# Patient Record
Sex: MALE | Race: BLACK OR AFRICAN AMERICAN | Employment: FULL TIME | URBAN - METROPOLITAN AREA
[De-identification: names, ages, dates, MRNs, and addresses within clinical notes are randomized per-mention and may not be internally consistent; named-entity substitution may affect disease eponyms.]

---

## 2021-01-07 ENCOUNTER — OFFICE VISIT (OUTPATIENT)
Dept: INTERNAL MEDICINE | Facility: CLINIC | Age: 31
End: 2021-01-07
Payer: COMMERCIAL

## 2021-01-07 VITALS
BODY MASS INDEX: 36.26 KG/M2 | DIASTOLIC BLOOD PRESSURE: 76 MMHG | TEMPERATURE: 98 F | SYSTOLIC BLOOD PRESSURE: 118 MMHG | HEIGHT: 67 IN | WEIGHT: 231 LBS | HEART RATE: 68 BPM | OXYGEN SATURATION: 98 %

## 2021-01-07 DIAGNOSIS — Z11.1 SCREENING EXAMINATION FOR PULMONARY TUBERCULOSIS: Primary | ICD-10-CM

## 2021-01-07 PROBLEM — J45.909 UNCOMPLICATED ASTHMA: Status: ACTIVE | Noted: 2021-01-07

## 2021-01-07 PROCEDURE — 99202 OFFICE O/P NEW SF 15 MIN: CPT | Performed by: PHYSICIAN ASSISTANT

## 2021-01-07 ASSESSMENT — MIFFLIN-ST. JEOR: SCORE: 1980.4

## 2021-01-07 NOTE — PROGRESS NOTES
"  Assessment & Plan     Screening examination for pulmonary tuberculosis  - TB INTRADERMAL TEST      No follow-ups on file.    Leila Gustafson PA-C  [facility]     Clayton Laboy is a 30 year old who presents to clinic today for the following health issues     HPI     Patient presents to clinic for mental screening test.  He has had no known exposure to TB.  He has no symptoms.  He has never been tested before.            Objective    /76   Pulse 68   Temp 98  F (36.7  C) (Oral)   Ht 1.708 m (5' 7.25\")   Wt 104.8 kg (231 lb)   SpO2 98%   BMI 35.91 kg/m    Body mass index is 35.91 kg/m .  Physical Exam   GENERAL: healthy, alert and no distress            "

## 2021-01-11 ENCOUNTER — ALLIED HEALTH/NURSE VISIT (OUTPATIENT)
Dept: NURSING | Facility: CLINIC | Age: 31
End: 2021-01-11
Payer: COMMERCIAL

## 2021-01-11 DIAGNOSIS — Z11.1 SCREENING EXAMINATION FOR PULMONARY TUBERCULOSIS: Primary | ICD-10-CM

## 2021-01-11 PROCEDURE — 86580 TB INTRADERMAL TEST: CPT

## 2021-01-13 ENCOUNTER — ALLIED HEALTH/NURSE VISIT (OUTPATIENT)
Dept: NURSING | Facility: CLINIC | Age: 31
End: 2021-01-13
Payer: COMMERCIAL

## 2021-01-13 DIAGNOSIS — Z11.1 SCREENING EXAMINATION FOR PULMONARY TUBERCULOSIS: Primary | ICD-10-CM

## 2021-01-13 LAB
PPDINDURATION: 0 MM (ref 0–5)
PPDREDNESS: 4 MM

## 2021-01-13 NOTE — PROGRESS NOTES
[place] results:  No induration.  No swelling.    4mm of Redness noted.     Preeti GONZALEZ BSN, RN, PHN